# Patient Record
Sex: FEMALE | Race: WHITE | Employment: UNEMPLOYED | URBAN - METROPOLITAN AREA
[De-identification: names, ages, dates, MRNs, and addresses within clinical notes are randomized per-mention and may not be internally consistent; named-entity substitution may affect disease eponyms.]

---

## 2021-11-08 ENCOUNTER — HOSPITAL ENCOUNTER (OUTPATIENT)
Dept: PHYSICAL THERAPY | Age: 26
End: 2021-11-08

## 2021-12-07 ENCOUNTER — APPOINTMENT (OUTPATIENT)
Dept: PHYSICAL THERAPY | Age: 26
End: 2021-12-07
Payer: COMMERCIAL

## 2021-12-08 ENCOUNTER — HOSPITAL ENCOUNTER (OUTPATIENT)
Dept: PHYSICAL THERAPY | Age: 26
Discharge: HOME | End: 2021-12-08
Payer: COMMERCIAL

## 2021-12-08 PROCEDURE — 97161 PT EVAL LOW COMPLEX 20 MIN: CPT

## 2021-12-08 NOTE — PROGRESS NOTES
[address] [facility] PHYSICAL THERAPY  [x]  At [facility] [address] [facility] [address] [place] [address] [id number] - Phone: [phone] Fax: [phone] [facility] / [address]  Patient Name: Aliza Aj : 1995   Medical   Diagnosis: Pelvic pain in female [R10.2] Treatment Diagnosis: Pelvic pain in female [R10.2]   Onset Date: 2019     Referral Source: Alicia Le* Start of Care [facility]): 2021   Prior Hospitalization: See medical history Provider #: [id number]   Prior Level of Function: Chronic symptoms since    Comorbidities: G4, P1, Myomectomy, Rectocele, Cystocele, Mild uterine prolapse, LBP, Depression, Anxiety   Medications: Verified on Patient Summary List   The Plan of Care and following information is based on the information from the initial evaluation.   ==================================================================================  Assessment / key information:  Patient is a 27 y.o. yo female who presents to [facility], [facility]. with diagnosis of Pelvic pain in female [R10.2]. Patient reports bloating, cramping, inability to pass gas, abdominal pain, frequent urination at every hour during the day, nocturia 8x nightly and urinary leaking with coughing. Patient is G4, P1 with vaginal delivery. She is presently 28 weeks pregnant without complications. BMs are 2-6x daily with consistent straining. She sometimes requires digital stimulation or a suppository. Symptoms began following the birth of her son 2019. An internal evaluation of the pelvic floor muscles was deferred secondary to pregnancy. On biofeedback patient presented with normal resting tone of the pelvic floor. Net rise of fast twitch contraction  was 34.63 microvolts and  net rise of slow twitch contraction was  28.95 microvolts. Quality of slow twitch muscles was poor.   Patient can benefit from PT for retraining of muscle control and relaxation on biofeedback , manual therapy, therapeutic exercises and modalities to decrease pain, Increase ability to engage in sex and undergo a gynecological exam, increase ease of bowel movements, decrease frequency of urination, nocturia and JAN to improve quality of life.  ==================================================================================  Eval Complexity: History: HIGH Complexity :3+ comorbidities / personal factors will impact the outcome/ POC Exam:LOW Complexity : 1-2 Standardized tests and measures addressing body structure, function, activity limitation and / or participation in recreation  Presentation: MEDIUM Complexity : Evolving with changing characteristics  Clinical Decision Making:Other outcome measures GRO  HIGH Overall Complexity:LOW   Problem List: Pelvic pain/dysfunction, Decreased pelvic floor mm awareness, Decreased pelvic floor mm strength and Other   Treatment Plan may include any combination of the following: Therapeutic exercise, Neuromuscular re-education, Manual therapy, Physical agent/modality, Patient education and Other  Patient / Family readiness to learn indicated by: asking questions, trying to perform skills and interest  Persons(s) to be included in education: patient (P)  Barriers to Learning/Limitations: None  Measures taken:    Patient Goal (s): \"Less bloating, better ability to pass gas and relieve bowels, less pelvic pain/pain with sex,   Patient self reported health status: poor  Rehabilitation Potential: good  Short Term Goals: To be accomplished in 4 weeks:     1) Patient performing pelvic floor exercises 3x day. 2) Patient will score +1 on GROC indicating abdominal symptoms a tiny bit better   3) Patient using normal toileting techniques. 4) Increase net rise of fast twitch contraction to 40 microvolts to increase continence. Long Term Goals: To be accomplished in 8 weeks:   1) Patient independent in HEP.      2) Patient will score +3 on GROC indicating abdominal symptoms somewhat better. 3) Patient will increase net rise of slow twitch contraction to 40 microvolts to increase pelvic support during ADLs. 4) Increase net rise of fast twitch contraction to 25 microvolts  to increase continence. Frequency / Duration:   Patient to be seen  1  times per week for 8  weeks:  Patient / Caregiver education and instruction: Pain Management, Exercises and Other Toileting techniques    Therapist Signature: Jd Velásquez, PT Date: 97/1/9136   Certification Period: na Time: 1:34 PM   ==================================================================================  I certify that the above Physical Therapy Services are being furnished while the patient is under my care. I agree with the treatment plan and certify that this therapy is necessary. Physician Signature:        Date:       Time:     Please sign and return to [facility], [facility]. or you may fax the signed copy to [phone]. Thank you. To ensure your patient receives the highest quality care and to avoid disruption in therapy please sign and return this plan of care within 21 days. Per Medicaid guidelines if the plan of care is not received within 21 days the patient's care must be put on hold until signed.        Marisol STOKES*

## 2021-12-08 NOTE — PROGRESS NOTES
PELVIC FLOOR DAILY TREATMENT NOTE -    Patient Name: Dana Ureña  Date:2021  : 1995  [x]  Patient  Verified  Payor: Flora Arias / Plan: [place] [employer] [place] / Product Type: PPO /    In time:11:25  Out time:12:15  Total Treatment Time (min): 50  Total Timed Codes (min): 15  1:1 Treatment Time (min): 15   Visit #: 1 of 8    Treatment Area: Pelvic pain in female [R10.2]    SUBJECTIVE  Pain Level (0-10 scale): 4  Any medication changes, allergies to medications, adverse drug reactions, diagnosis change, or new procedure performed?: [x] No    [] Yes (see summary sheet for update)  Subjective functional status/changes:   [] No changes reported  See POC    OBJECTIVE      Billed With/As:   [] TE   [] TA   [] Neuro   [] Self Care Patient Education: [x] Review HEP/POC/Goals  Educated Pt in pelvic floor anatomy, function/dysfunction and correct execution of a pelvic floor contraction. Reviewed biofeedback results. [] Progressed/Changed HEP based on:   [] positioning   [] body mechanics   [] transfers   [] heat/ice application    [] other:        Pain Level (0-10 scale) post treatment: 4    ASSESSMENT/Changes in Function:   Justification for Eval Code Complexity:  Patient History : See POC  Examination see exam   Clinical Presentation: evolving  Clinical Decision Making : GROC    Patient will continue to benefit from skilled PT services to modify and progress therapeutic interventions, address functional mobility deficits, address strength deficits, analyze and address soft tissue restrictions, analyze and modify body mechanics/ergonomics, assess and modify postural abnormalities, instruct in home and community integration and Address JAN, straining with bowel movements, dyspareunia to attain remaining goals.      [x]  See Plan of Care  []  See progress note/recertification  []  See Discharge Summary         Progress towards goals / Updated goals:  Initial evaluation completed with home exercise program and education initiated.        PLAN  [x]  Upgrade activities as tolerated     []  Continue plan of care  []  Update interventions per flow sheet       []  Discharge due to:_  []  Other:_      Nanda Barry PT 12/8/2021  12:15 PM      Future Appointments   Date Time Provider Geraldo Cordon   12/15/2021 10:30 AM Paradise Koch, PT [facility]   12/21/2021  3:30 PM Paradise Koch, PT [facility]   1/5/2022 10:30 AM Paradise Koch, PT [facility]   1/12/2022 10:30 AM Paradise Koch, PT [facility]   1/19/2022 10:30 AM Paradise Koch, [facility]   1/26/2022 10:30 AM Paradise Koch, [facility]

## 2021-12-15 ENCOUNTER — HOSPITAL ENCOUNTER (OUTPATIENT)
Dept: PHYSICAL THERAPY | Age: 26
Discharge: HOME | End: 2021-12-15
Payer: COMMERCIAL

## 2021-12-15 PROCEDURE — 97530 THERAPEUTIC ACTIVITIES: CPT

## 2021-12-15 PROCEDURE — 97110 THERAPEUTIC EXERCISES: CPT

## 2021-12-15 PROCEDURE — 97112 NEUROMUSCULAR REEDUCATION: CPT

## 2021-12-15 NOTE — PROGRESS NOTES
PELVIC FLOOR DAILY TREATMENT NOTE  1-21    Patient Name: Vaishali Braun  Date:12/15/2021  : 1995  [x]  Patient  Verified  Payor: Sid Rey / Plan: [place] [employer] P[employer] / Product Type: PPO /    In time:10:35  Out time:11:22  Total Treatment Time (min): 47    (for [employer] and [employer] only)  Total Timed Codes (min): 47  1:1 Treatment Time (min): 47     Visit #: 2 of 8    Treatment Area: Pelvic pain in female [R10.2]    SUBJECTIVE  Pain Level (0-10 scale): 0  Any medication changes, allergies to medications, adverse drug reactions, diagnosis change, or new procedure performed?: [x] No    [] Yes (see summary sheet for update)  Subjective functional status/changes:   [] No changes reported  Patient reports doing HEP 2xday. OBJECTIVE  Modality rationale: Increase pelvic floor muscle strength and Improve quality of pelvic floor contractions in order to Increase urinary continence, Increase ability to delay urination, Decrease frequency of urination and Decrease nocturia.    Min Type Additional Details   17 [x] Biofeedback x 17 minutes    seated surface    [] Estim: []Att   []Unatt        []TENS instruct                  []IFC  []Premod   []NMES                     []Other:  []w/US   []w/ice   []w/heat  Position:  Location:    []  Traction: [] Cervical       []Lumbar                       [] Prone          []Supine                       []Intermittent   []Continuous Lbs:  [] before manual  [] after manual    []  Ultrasound: []Continuous   [] Pulsed                           []1MHz   []3MHz Location:  W/cm2:    []  Iontophoresis with dexamethasone         Location: [] Take home patch   [] In clinic    []  Ice     []  heat  []  Ice massage Position:  Location:    []  Vasopneumatic Device Pressure:       [] lo [] med [] hi   Temperature: [] lo [] med [] hi   [x] Skin assessment post-treatment:  [x]intact []redness- no adverse reaction       []redness  adverse reaction:     15 min Therapeutic Exercise:  [x] See flow sheet :  [] Pelvic floor strengthening                []  Pelvic floor downtraining  []  Quality pelvic floor contractions      []  Relaxation techniques  []  Urge suppression exercises  [x]  Other: Core strengthening   Rationale: Increase core strength in order to Improve ability to perform ADLs. 15 min Therapeutic Activity:  [x]  See flow sheet :Toileting techniques while on biofeedback including posture, breathing and mild brace and bulge. Rationale: Decrease resting tone of the pelvic floor in order to Improve frequency and ease of bowel movements. min Manual Therapy:    Rationale: na in order to na. The manual therapy interventions were performed at a separate and distinct time from the therapeutic activities interventions. Billed With/As:   [x] TE   [] TA   [] Neuro   [] Self Care Patient Education: [x] Review HEP    [] Progressed/Changed HEP based on: Add 3 second PF contractions. Add core strengthening TA, hip add ball, hip abd TB in semireclining, BKFO 3x week. Moderate effort with exercises. Begin normal toileting techniques. [] positioning   [] body mechanics   [] transfers   [] heat/ice application    [] other:        Other Objective/Functional Measures:    [x]baseline resting tone: WNLs   [x]slow twitch mms 35.4(30.27) in sitting. [x]fast twitch mms 35.6(28.63) . Pain Level (0-10 scale) post treatment: 0    ASSESSMENT/Changes in Function:  Began core strengthening exercises. Patient will continue to benefit from skilled PT services to modify and progress therapeutic interventions, address functional mobility deficits, address strength deficits, analyze and address soft tissue restrictions, analyze and modify body mechanics/ergonomics, assess and modify postural abnormalities, instruct in home and community integration and Address JAN, straining with bowel movements, dyspareunia to attain remaining goals.        []  See Plan of Care  []  See progress note/recertification  []  See Discharge Summary         Progress towards goals / Updated goals:                1) Patient performing pelvic floor exercises 3x day. Met                 2) Patient will score +1 on GROC indicating abdominal symptoms a tiny bit better                 3) Patient using normal toileting techniques. 4) Increase net rise of fast twitch contraction to 40 microvolts to increase continence.     PLAN  []  Upgrade activities as tolerated     []  Continue plan of care  []  Update interventions per flow sheet       []  Discharge due to:_  []  Other:_      Diann Holliday PT 12/15/2021  11:22 AM      Future Appointments   Date Time Provider Geraldo Cordon   12/21/2021  3:30 PM Stephane Gonzalez, PT [facility]   1/5/2022 10:30 AM Stephane Gonzalez, PT [facility]   1/12/2022 10:30 AM Stephane Gonzalez, PT [facility]   1/19/2022 10:30 AM Stephane Gonzalez, PT [facility]   1/26/2022 10:30 AM Stephane Gonzalez, PT [facility]

## 2021-12-21 ENCOUNTER — APPOINTMENT (OUTPATIENT)
Dept: PHYSICAL THERAPY | Age: 26
End: 2021-12-21
Payer: COMMERCIAL

## 2022-01-05 ENCOUNTER — TELEPHONE (OUTPATIENT)
Dept: PHYSICAL THERAPY | Age: 27
End: 2022-01-05

## 2022-01-07 ENCOUNTER — TELEPHONE (OUTPATIENT)
Dept: PHYSICAL THERAPY | Age: 27
End: 2022-01-07

## 2022-01-07 NOTE — TELEPHONE ENCOUNTER
Pt wanted to know if there was other clinics in the area for women's health wants to try different technique for diagnosis

## 2022-01-12 ENCOUNTER — APPOINTMENT (OUTPATIENT)
Dept: PHYSICAL THERAPY | Age: 27
End: 2022-01-12

## 2022-01-12 NOTE — PROGRESS NOTES
[address] [facility]  [address] [facility] [address],[facility], [address] [facility] - Phone: [phone]  Fax: [phone]    DISCHARGE NOTE  Patient Name: Yanelis Perez : 1995   Treatment/Medical Diagnosis: Pelvic pain in female [R10.2]   Referral Source: Jack Bernal     Date of Initial Visit: 2021 Attended Visits: 2 Missed Visits: 5       SUMMARY OF TREATMENT  Pt attended only initial evaluation and     1     follow-ups and then did not return. Therefore a formal reassessment of goals was not performed. RECOMMENDATIONS  Discontinue physical therapy due to patient not returning. If you have any questions/comments please contact us directly at [phone]. Thank you for allowing us to assist in the care of your patient. Therapist Signature:  Yulissa Sterling PT Date: 2022     Time: 12:41 PM     To ensure we are able to process the patients encounter and avoid risk of your patient receiving a bill for our services, please sign and return this discharge summary by 2022. (30days following DC date)  Physician signature _______________________ Date _______________ Time _____________

## 2022-01-19 ENCOUNTER — APPOINTMENT (OUTPATIENT)
Dept: PHYSICAL THERAPY | Age: 27
End: 2022-01-19

## 2022-01-26 ENCOUNTER — APPOINTMENT (OUTPATIENT)
Dept: PHYSICAL THERAPY | Age: 27
End: 2022-01-26